# Patient Record
Sex: MALE | Race: WHITE | NOT HISPANIC OR LATINO | URBAN - METROPOLITAN AREA
[De-identification: names, ages, dates, MRNs, and addresses within clinical notes are randomized per-mention and may not be internally consistent; named-entity substitution may affect disease eponyms.]

---

## 2020-09-03 ENCOUNTER — OFFICE VISIT CONVERTED (OUTPATIENT)
Dept: ORTHOPEDIC SURGERY | Facility: CLINIC | Age: 40
End: 2020-09-03
Attending: ORTHOPAEDIC SURGERY

## 2020-09-09 ENCOUNTER — HOSPITAL ENCOUNTER (OUTPATIENT)
Dept: CT IMAGING | Facility: HOSPITAL | Age: 40
Discharge: HOME | End: 2020-09-09
Attending: ORTHOPAEDIC SURGERY

## 2020-09-17 ENCOUNTER — OFFICE VISIT CONVERTED (OUTPATIENT)
Dept: ORTHOPEDIC SURGERY | Facility: CLINIC | Age: 40
End: 2020-09-17
Attending: ORTHOPAEDIC SURGERY

## 2020-10-08 ENCOUNTER — OFFICE VISIT CONVERTED (OUTPATIENT)
Dept: ORTHOPEDIC SURGERY | Facility: CLINIC | Age: 40
End: 2020-10-08
Attending: PHYSICIAN ASSISTANT

## 2020-11-24 ENCOUNTER — CONVERSION ENCOUNTER (OUTPATIENT)
Dept: ORTHOPEDIC SURGERY | Facility: CLINIC | Age: 40
End: 2020-11-24

## 2020-11-24 ENCOUNTER — OFFICE VISIT CONVERTED (OUTPATIENT)
Dept: ORTHOPEDIC SURGERY | Facility: CLINIC | Age: 40
End: 2020-11-24
Attending: PHYSICIAN ASSISTANT

## 2020-12-07 ENCOUNTER — HOSPITAL ENCOUNTER (OUTPATIENT)
Dept: CT IMAGING | Facility: HOSPITAL | Age: 40
Discharge: HOME | End: 2020-12-07
Attending: PHYSICIAN ASSISTANT
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
## 2021-05-14 VITALS — HEART RATE: 67 BPM | HEIGHT: 66 IN | WEIGHT: 171 LBS | OXYGEN SATURATION: 96 % | BODY MASS INDEX: 27.48 KG/M2

## 2021-05-14 VITALS — BODY MASS INDEX: 26.52 KG/M2 | HEIGHT: 66 IN | WEIGHT: 165 LBS

## 2021-05-14 VITALS — OXYGEN SATURATION: 98 % | BODY MASS INDEX: 27.04 KG/M2 | HEART RATE: 88 BPM | HEIGHT: 66 IN | WEIGHT: 168.25 LBS

## 2021-05-14 VITALS — BODY MASS INDEX: 27 KG/M2 | HEART RATE: 52 BPM | OXYGEN SATURATION: 97 % | HEIGHT: 66 IN | WEIGHT: 168 LBS
